# Patient Record
Sex: MALE | Race: WHITE | NOT HISPANIC OR LATINO | URBAN - METROPOLITAN AREA
[De-identification: names, ages, dates, MRNs, and addresses within clinical notes are randomized per-mention and may not be internally consistent; named-entity substitution may affect disease eponyms.]

---

## 2024-07-19 ENCOUNTER — TELEPHONE ENCOUNTER (OUTPATIENT)
Dept: URBAN - METROPOLITAN AREA CLINIC 80 | Facility: CLINIC | Age: 69
End: 2024-07-19

## 2024-07-19 ENCOUNTER — DASHBOARD ENCOUNTERS (OUTPATIENT)
Age: 69
End: 2024-07-19

## 2024-07-19 ENCOUNTER — OFFICE VISIT (OUTPATIENT)
Dept: URBAN - METROPOLITAN AREA TELEHEALTH 2 | Facility: TELEHEALTH | Age: 69
End: 2024-07-19
Payer: MEDICARE

## 2024-07-19 ENCOUNTER — LAB OUTSIDE AN ENCOUNTER (OUTPATIENT)
Dept: URBAN - METROPOLITAN AREA TELEHEALTH 2 | Facility: TELEHEALTH | Age: 69
End: 2024-07-19

## 2024-07-19 DIAGNOSIS — Z12.11 COLON CANCER SCREENING: ICD-10-CM

## 2024-07-19 DIAGNOSIS — B18.2 CHRONIC HEPATITIS C WITHOUT HEPATIC COMA: ICD-10-CM

## 2024-07-19 DIAGNOSIS — K83.8 COMMON BILE DUCT DILATATION: ICD-10-CM

## 2024-07-19 DIAGNOSIS — R79.89 ELEVATED LFTS: ICD-10-CM

## 2024-07-19 PROBLEM — 128302006: Status: ACTIVE | Noted: 2024-07-19

## 2024-07-19 PROBLEM — 123608004: Status: ACTIVE | Noted: 2024-07-19

## 2024-07-19 PROCEDURE — 99204 OFFICE O/P NEW MOD 45 MIN: CPT | Performed by: STUDENT IN AN ORGANIZED HEALTH CARE EDUCATION/TRAINING PROGRAM

## 2024-07-19 NOTE — HPI-TODAY'S VISIT:
Patient says he developed central chest pain, N/V, chalky stools, and inability sleep that began last month. He says he was told 2 years ago he was in ED and told that he had gallstones and gallbladder thickening and fatty liver and HCV. Pain would come and go sporadically sinc then. When symptoms began last month, he saw primary care NP for abdominal pain. She ordered labs and abd US. Labs revealed , , , and T.bili 1.1. Lipase and amylase were wnl. Abd US revealed cholelithiasis and a prominent CBD dilated at 15mm. Previously in 2022, LFTs were normal other than ALT of 48. In 2014, AST and ALT were mildly elevated at 40 and 50, respectively. On chart review, patient was previously told he had HCV in 2012 but he is not aware of every being treated, says he was suppose to receive medications but never did.  Abd US 2024: 1. Cholelithiasis. Prominent common bile duct at 15 mm. 2. Possible right renal calcification. CT 7/2022: 1. Fatty infiltration of liver.2. Gallbladder wall thickening is seen. No definite calculi are noted.3. Small hiatal hernia. Abd US 2014: Echogenic focus in the upper pole of the right kidney, likely representing a nonobstructing calculus. Increased echogenicity of the liver, likely due to fatty infiltration. If there is need to define focal liver lesions, MRI may be considered for further evaluation. HIDA 2012: Normal study though it should be noted that the gallbladder ejection fraction is borderline normal at 36%, normal being 35% and greater. Abd US 2012 Findings consistent with diffuse fatty infiltration of the liver. Questionable mild gallbladder wall thickening without gallstone or sonographic Mendez's sign evident.  Findings suggestive for a nonobstructing 7 mm right renal calculus. n/a

## 2024-07-23 ENCOUNTER — LAB OUTSIDE AN ENCOUNTER (OUTPATIENT)
Dept: URBAN - METROPOLITAN AREA CLINIC 80 | Facility: CLINIC | Age: 69
End: 2024-07-23

## 2024-07-31 LAB
ALBUMIN/GLOBULIN RATIO: 1.1
ALBUMIN: 3.5
ALKALINE PHOSPHATASE: 199
ALT: 59
AST: 49
BILIRUBIN, TOTAL: 1.4
BUN/CREATININE RATIO: (no result)
CALCIUM: 9.5
CARBON DIOXIDE: 25
CHLORIDE: 103
CREATININE: 0.75
EGFR: 98
FERRITIN, SERUM: 704
FIB 4 INDEX: 1.73
FIB 4 INTERPRETATION: (no result)
GLOBULIN: 3.3
GLUCOSE: 139
HCV RNA, QUANTITATIVE REAL TIME PCR: (no result)
HCV RNA, QUANTITATIVE REAL TIME PCR: (no result)
HCV RNA, QUANTITATIVE REAL TIME PCR: 5.16
HEPATITIS A AB, TOTAL: (no result)
HEPATITIS B CORE AB TOTAL: (no result)
HEPATITIS B SURFACE AB, QN: <5
HEPATITIS B SURFACE ANTIGEN: (no result)
HEPATITIS C ANTIBODY: REACTIVE
HEPATITIS C VIRAL RNA: 1
HIV AG/AB, 4TH GEN: (no result)
INTERPRETATION: (no result)
PLATELET COUNT: 251
POTASSIUM: 4.8
PROTEIN, TOTAL: 6.8
SODIUM: 138
UREA NITROGEN (BUN): 10

## 2024-08-01 ENCOUNTER — TELEPHONE ENCOUNTER (OUTPATIENT)
Dept: URBAN - METROPOLITAN AREA CLINIC 80 | Facility: CLINIC | Age: 69
End: 2024-08-01

## 2024-08-20 ENCOUNTER — OFFICE VISIT (OUTPATIENT)
Dept: URBAN - METROPOLITAN AREA CLINIC 80 | Facility: CLINIC | Age: 69
End: 2024-08-20
Payer: MEDICARE

## 2024-08-20 VITALS
HEART RATE: 92 BPM | WEIGHT: 179.2 LBS | DIASTOLIC BLOOD PRESSURE: 65 MMHG | BODY MASS INDEX: 27.16 KG/M2 | TEMPERATURE: 97.3 F | SYSTOLIC BLOOD PRESSURE: 108 MMHG | HEIGHT: 68 IN

## 2024-08-20 DIAGNOSIS — B18.2 CHRONIC HEPATITIS C WITHOUT HEPATIC COMA: ICD-10-CM

## 2024-08-20 DIAGNOSIS — K83.8 COMMON BILE DUCT DILATATION: ICD-10-CM

## 2024-08-20 DIAGNOSIS — R79.89 ELEVATED LFTS: ICD-10-CM

## 2024-08-20 PROCEDURE — 99214 OFFICE O/P EST MOD 30 MIN: CPT | Performed by: STUDENT IN AN ORGANIZED HEALTH CARE EDUCATION/TRAINING PROGRAM

## 2024-08-20 RX ORDER — TRAMADOL HYDROCHLORIDE 50 MG/1
TAKE ONE TABLET BY MOUTH EVERY 6 HOURS AS NEEDED FOR PAIN TABLET, COATED ORAL
Qty: 60 UNSPECIFIED | Refills: 0 | Status: ACTIVE | COMMUNITY

## 2024-08-20 RX ORDER — GLECAPREVIR AND PIBRENTASVIR 40; 100 MG/1; MG/1
3 TABLETS TABLET, FILM COATED ORAL ONCE A DAY
Qty: 168 TABLET | Refills: 0 | OUTPATIENT
Start: 2024-08-20 | End: 2024-10-15

## 2024-08-20 RX ORDER — ERGOCALCIFEROL 1.25 MG/1
TAKE ONE CAPSULE BY MOUTH EVERY WEEK CAPSULE, LIQUID FILLED ORAL
Qty: 12 UNSPECIFIED | Refills: 0 | Status: ACTIVE | COMMUNITY

## 2024-08-20 NOTE — HPI-TODAY'S VISIT:
Mr. Bowman is a 69yoM who presents for follow-up. He was initally seen for elevated LFTs, cholelithiasis, dilated CBD, and untreated HCV. Recent Abd US revealed cholelithiasis and a prominent CBD dilated at 15mm. Labs revealed , , , and T.bili 1.1. Lipase and amylase were wnl. Previously in 2022, LFTs were normal other than ALT of 48. In 2014, AST and ALT were mildly elevated at 40 and 50, respectively. I ordered MRCP and HCV labs as well as repeat CMP.  Labs confirm active HCV Genotype 1 without coinfection of HIV, HAV, or HBV. Also with T. bili 1.4, , AST 49, and ALT 59. . MRCP scheduled for later this month. No abdominal pain or jaundice. Injured left arm while drinking but reports only 5 drinks per week - wife confirms though adds this is typically all in one day.

## 2024-08-23 ENCOUNTER — TELEPHONE ENCOUNTER (OUTPATIENT)
Dept: URBAN - METROPOLITAN AREA CLINIC 80 | Facility: CLINIC | Age: 69
End: 2024-08-23

## 2024-08-28 ENCOUNTER — TELEPHONE ENCOUNTER (OUTPATIENT)
Dept: URBAN - METROPOLITAN AREA CLINIC 80 | Facility: CLINIC | Age: 69
End: 2024-08-28

## 2024-08-28 RX ORDER — VELPATASVIR AND SOFOSBUVIR 100; 400 MG/1; MG/1
1 TABLET TABLET, FILM COATED ORAL ONCE A DAY
Qty: 84 TABLET | Refills: 0 | OUTPATIENT
Start: 2024-08-28 | End: 2024-11-19

## 2024-08-28 RX ORDER — GLECAPREVIR AND PIBRENTASVIR 40; 100 MG/1; MG/1
3 TABLETS TABLET, FILM COATED ORAL ONCE A DAY
OUTPATIENT
Start: 2024-08-20 | End: 2024-10-15

## 2024-08-29 ENCOUNTER — TELEPHONE ENCOUNTER (OUTPATIENT)
Dept: URBAN - METROPOLITAN AREA CLINIC 80 | Facility: CLINIC | Age: 69
End: 2024-08-29

## 2024-09-03 ENCOUNTER — TELEPHONE ENCOUNTER (OUTPATIENT)
Dept: URBAN - METROPOLITAN AREA CLINIC 118 | Facility: CLINIC | Age: 69
End: 2024-09-03

## 2024-09-10 ENCOUNTER — TELEPHONE ENCOUNTER (OUTPATIENT)
Dept: URBAN - NONMETROPOLITAN AREA CLINIC 2 | Facility: CLINIC | Age: 69
End: 2024-09-10

## 2024-10-29 ENCOUNTER — OFFICE VISIT (OUTPATIENT)
Dept: URBAN - METROPOLITAN AREA CLINIC 80 | Facility: CLINIC | Age: 69
End: 2024-10-29

## 2024-11-21 ENCOUNTER — OFFICE VISIT (OUTPATIENT)
Dept: URBAN - METROPOLITAN AREA CLINIC 80 | Facility: CLINIC | Age: 69
End: 2024-11-21

## 2024-12-05 ENCOUNTER — OFFICE VISIT (OUTPATIENT)
Dept: URBAN - METROPOLITAN AREA CLINIC 80 | Facility: CLINIC | Age: 69
End: 2024-12-05
Payer: MEDICARE

## 2024-12-05 VITALS
WEIGHT: 182.8 LBS | SYSTOLIC BLOOD PRESSURE: 111 MMHG | BODY MASS INDEX: 27.71 KG/M2 | DIASTOLIC BLOOD PRESSURE: 69 MMHG | HEART RATE: 78 BPM | TEMPERATURE: 97 F | HEIGHT: 68 IN

## 2024-12-05 DIAGNOSIS — K83.8 COMMON BILE DUCT DILATATION: ICD-10-CM

## 2024-12-05 DIAGNOSIS — B18.2 CHRONIC HEPATITIS C WITHOUT HEPATIC COMA: ICD-10-CM

## 2024-12-05 PROCEDURE — 99214 OFFICE O/P EST MOD 30 MIN: CPT | Performed by: STUDENT IN AN ORGANIZED HEALTH CARE EDUCATION/TRAINING PROGRAM

## 2024-12-05 RX ORDER — TRAMADOL HYDROCHLORIDE 50 MG/1
TAKE ONE TABLET BY MOUTH EVERY 6 HOURS AS NEEDED FOR PAIN TABLET, COATED ORAL
Qty: 60 UNSPECIFIED | Refills: 0 | Status: ON HOLD | COMMUNITY

## 2024-12-05 RX ORDER — ERGOCALCIFEROL 1.25 MG/1
TAKE ONE CAPSULE BY MOUTH EVERY WEEK CAPSULE, LIQUID FILLED ORAL
Qty: 12 UNSPECIFIED | Refills: 0 | Status: ON HOLD | COMMUNITY

## 2024-12-05 NOTE — HPI-TODAY'S VISIT:
Mr. Bowman is a 69yoM who presents for follow-up after completing 12 week course of Epclusa for chronic HCV. He was initally seen for elevated LFTs, cholelithiasis, dilated CBD, and untreated HCV. Recent Abd US revealed cholelithiasis and a prominent CBD dilated at 15mm. Labs revealed , , , and T.bili 1.1. Lipase and amylase were wnl. I ordered MRCP and HCV labs as well as repeat CMP.   Labs confirm active HCV Genotype 1 without coinfection of HIV, HAV, or HBV. Also with T. bili 1.4, , AST 49, and ALT 59. . MRCP was negative for choledocholithiasis though passed stones or cholelithiasis may have contributed to elevated LFTs. I started patient on Epclusa (Mavyret was not covered by insurance).  No pain, jaundice, weight loss, ascites, or edema. Pt tolerated medication fine without issue.

## 2024-12-07 LAB
ALBUMIN/GLOBULIN RATIO: 1.4
ALBUMIN: 4.1
ALKALINE PHOSPHATASE: 89
ALT: 21
AST: 20
BILIRUBIN, TOTAL: 0.6
BUN/CREATININE RATIO: (no result)
CALCIUM: 9.8
CARBON DIOXIDE: 27
CHLORIDE: 105
CREATININE: 0.7
EGFR: 100
FIB 4 INDEX: 1.1
FIB 4 INTERPRETATION: (no result)
FIB 4 SUMMARY: (no result)
GLOBULIN: 3
GLUCOSE: 98
HCV RNA, QUANTITATIVE: <1.18
HCV RNA, QUANTITATIVE: <15
PLATELET COUNT: 269
POTASSIUM: 4.6
PROTEIN, TOTAL: 7.1
SODIUM: 139
UREA NITROGEN (BUN): 12

## 2024-12-08 ENCOUNTER — TELEPHONE ENCOUNTER (OUTPATIENT)
Dept: URBAN - METROPOLITAN AREA CLINIC 80 | Facility: CLINIC | Age: 69
End: 2024-12-08

## 2025-03-31 ENCOUNTER — WEB ENCOUNTER (OUTPATIENT)
Dept: URBAN - METROPOLITAN AREA CLINIC 80 | Facility: CLINIC | Age: 70
End: 2025-03-31